# Patient Record
Sex: MALE | Race: WHITE | NOT HISPANIC OR LATINO | Employment: FULL TIME | URBAN - NONMETROPOLITAN AREA
[De-identification: names, ages, dates, MRNs, and addresses within clinical notes are randomized per-mention and may not be internally consistent; named-entity substitution may affect disease eponyms.]

---

## 2022-03-06 ENCOUNTER — HOSPITAL ENCOUNTER (EMERGENCY)
Facility: HOSPITAL | Age: 27
Discharge: HOME | End: 2022-03-06
Attending: EMERGENCY MEDICINE
Payer: COMMERCIAL

## 2022-03-06 ENCOUNTER — APPOINTMENT (EMERGENCY)
Dept: RADIOLOGY | Facility: HOSPITAL | Age: 27
End: 2022-03-06
Payer: COMMERCIAL

## 2022-03-06 VITALS
HEART RATE: 99 BPM | WEIGHT: 170 LBS | TEMPERATURE: 97.3 F | RESPIRATION RATE: 18 BRPM | DIASTOLIC BLOOD PRESSURE: 86 MMHG | HEIGHT: 66 IN | BODY MASS INDEX: 27.32 KG/M2 | OXYGEN SATURATION: 100 % | SYSTOLIC BLOOD PRESSURE: 143 MMHG

## 2022-03-06 DIAGNOSIS — S62.639B OPEN FRACTURE OF TUFT OF DISTAL PHALANX OF FINGER: ICD-10-CM

## 2022-03-06 DIAGNOSIS — S61.213A LACERATION OF LEFT MIDDLE FINGER: Primary | ICD-10-CM

## 2022-03-06 PROCEDURE — 90715 TDAP VACCINE 7 YRS/> IM: CPT | Performed by: PHYSICIAN ASSISTANT

## 2022-03-06 PROCEDURE — 99283 EMERGENCY DEPT VISIT LOW MDM: CPT | Performed by: PHYSICIAN ASSISTANT

## 2022-03-06 PROCEDURE — 90471 IMMUNIZATION ADMIN: CPT

## 2022-03-06 PROCEDURE — 12001 RPR S/N/AX/GEN/TRNK 2.5CM/<: CPT | Performed by: PHYSICIAN ASSISTANT

## 2022-03-06 PROCEDURE — 73140 X-RAY EXAM OF FINGER(S): CPT

## 2022-03-06 PROCEDURE — 99283 EMERGENCY DEPT VISIT LOW MDM: CPT

## 2022-03-06 RX ORDER — LIDOCAINE HYDROCHLORIDE 10 MG/ML
10 INJECTION, SOLUTION EPIDURAL; INFILTRATION; INTRACAUDAL; PERINEURAL ONCE
Status: COMPLETED | OUTPATIENT
Start: 2022-03-06 | End: 2022-03-06

## 2022-03-06 RX ORDER — CEPHALEXIN 500 MG/1
500 CAPSULE ORAL EVERY 6 HOURS SCHEDULED
Qty: 20 CAPSULE | Refills: 0 | Status: SHIPPED | OUTPATIENT
Start: 2022-03-06 | End: 2022-03-11

## 2022-03-06 RX ORDER — CEPHALEXIN 250 MG/1
500 CAPSULE ORAL ONCE
Status: COMPLETED | OUTPATIENT
Start: 2022-03-06 | End: 2022-03-06

## 2022-03-06 RX ORDER — BACITRACIN, NEOMYCIN, POLYMYXIN B 400; 3.5; 5 [USP'U]/G; MG/G; [USP'U]/G
1 OINTMENT TOPICAL ONCE
Status: COMPLETED | OUTPATIENT
Start: 2022-03-06 | End: 2022-03-06

## 2022-03-06 RX ADMIN — LIDOCAINE HYDROCHLORIDE 10 ML: 10 INJECTION, SOLUTION EPIDURAL; INFILTRATION; INTRACAUDAL; PERINEURAL at 15:55

## 2022-03-06 RX ADMIN — TETANUS TOXOID, REDUCED DIPHTHERIA TOXOID AND ACELLULAR PERTUSSIS VACCINE, ADSORBED 0.5 ML: 5; 2.5; 8; 8; 2.5 SUSPENSION INTRAMUSCULAR at 15:55

## 2022-03-06 RX ADMIN — BACITRACIN, NEOMYCIN, POLYMYXIN B 1 SMALL APPLICATION: 400; 3.5; 5 OINTMENT TOPICAL at 17:05

## 2022-03-06 RX ADMIN — CEPHALEXIN 500 MG: 250 CAPSULE ORAL at 17:05

## 2022-03-06 NOTE — DISCHARGE INSTRUCTIONS
Please keep wound clean and dry    Please return with any signs of infection such as red streaking away from the wound, pus drainage, fevers or chills  Please return or see her family doctor in 1 week for suture removal and wound recheck

## 2022-03-06 NOTE — ED ATTENDING ATTESTATION
3/6/2022  ISean MD, saw and evaluated the patient  I have discussed the patient with the resident/non-physician practitioner and agree with the resident's/non-physician practitioner's findings, Plan of Care, and MDM as documented in the resident's/non-physician practitioner's note, except where noted  All available labs and Radiology studies were reviewed  I was present for key portions of any procedure(s) performed by the resident/non-physician practitioner and I was immediately available to provide assistance  At this point I agree with the current assessment done in the Emergency Department  I have conducted an independent evaluation of this patient a history and physical is as follows:    ED Course     Laceration to distal phalanx of the left middle finger while working on his motorcycle today  Tetanus status unknown  On exam the patient is awake alert  Nontoxic appearing  There is a laceration through the nail at the midpoint  Bleeding is controlled  Cap refill less than 2 seconds      Critical Care Time  Procedures

## 2022-03-06 NOTE — ED PROVIDER NOTES
History  Chief Complaint   Patient presents with    Finger Laceration     pt arrives reporting he was working on his motorcylce when he got his finger caught in the machine and took off tip of left middle finger  75-year-old male presents emergency department for evaluation of left middle finger injury  Patient states he was working on his motorcycle when his finger got caught in the machine and sliced the tip of his finger  Unsure last tetanus  Denies numbness  Denies any pain medications prior to arrival   Does not want any pain medications now  No use of blood thinneres      History provided by:  Patient  Finger Laceration  Location:  Finger  Finger laceration location:  L middle finger  Length:  1  Depth:  Cutaneous  Quality: straight    Time since incident:  30 minutes  Laceration mechanism:  Metal edge  Foreign body present:  No foreign bodies  Relieved by:  Pressure  Tetanus status:  Unknown  Associated symptoms: no fever, no focal weakness, no numbness, no rash, no redness, no swelling and no streaking        None       History reviewed  No pertinent past medical history  Past Surgical History:   Procedure Laterality Date    WISDOM TOOTH EXTRACTION         History reviewed  No pertinent family history  I have reviewed and agree with the history as documented  E-Cigarette/Vaping     E-Cigarette/Vaping Substances     Social History     Tobacco Use    Smoking status: Never Smoker    Smokeless tobacco: Never Used   Substance Use Topics    Alcohol use: Yes    Drug use: Never       Review of Systems   Constitutional: Negative for fever  Skin: Positive for wound  Negative for rash  Neurological: Negative for focal weakness  All other systems reviewed and are negative  Physical Exam  Physical Exam  Vitals and nursing note reviewed  Constitutional:       Appearance: Normal appearance  HENT:      Head: Normocephalic     Eyes:      Conjunctiva/sclera: Conjunctivae normal    Pulmonary: Effort: Pulmonary effort is normal    Musculoskeletal:         General: No swelling  Normal range of motion  Skin:     General: Skin is warm and dry  Capillary Refill: Capillary refill takes less than 2 seconds  Findings: No bruising, erythema or rash  Comments: 1 cm laceration through the nail the left middle finger  Bleeding controlled  No gross contamination  No wound to the palmar aspect of the finger   Neurological:      General: No focal deficit present  Mental Status: He is alert and oriented to person, place, and time  Psychiatric:         Mood and Affect: Mood is anxious           Vital Signs  ED Triage Vitals [03/06/22 1545]   Temperature Pulse Respirations Blood Pressure SpO2   (!) 97 3 °F (36 3 °C) 99 18 143/86 100 %      Temp src Heart Rate Source Patient Position - Orthostatic VS BP Location FiO2 (%)   -- -- -- -- --      Pain Score       3           Vitals:    03/06/22 1545   BP: 143/86   Pulse: 99         Visual Acuity      ED Medications  Medications   cephalexin (KEFLEX) capsule 500 mg (has no administration in time range)   neomycin-bacitracin-polymyxin b (NEOSPORIN) ointment 1 small application (has no administration in time range)   tetanus-diphtheria-acellular pertussis (BOOSTRIX) IM injection 0 5 mL (0 5 mL Intramuscular Given 3/6/22 1555)   lidocaine (PF) (XYLOCAINE-MPF) 1 % injection 10 mL (10 mL Infiltration Given 3/6/22 1555)       Diagnostic Studies  Results Reviewed     None                 XR finger third digit-middle LEFT   ED Interpretation by Michel Rincon MD (03/06 7056)   Tuft fracture 3rd finger distal phalanx                 Procedures  Laceration repair    Date/Time: 3/6/2022 4:20 PM  Performed by: Gautam Escamilla PA-C  Authorized by: Gautam Escamilla PA-C   Consent given by: patient  Patient understanding: patient states understanding of the procedure being performed  Patient consent: the patient's understanding of the procedure matches consent given  Radiology Images displayed and confirmed  If images not available, report reviewed: imaging studies available  Patient identity confirmed: verbally with patient and arm band  Body area: upper extremity  Location details: left long finger  Laceration length: 1 cm  Tendon involvement: none  Nerve involvement: none  Anesthesia: digital block    Anesthesia:  Local Anesthetic: lidocaine 1% without epinephrine    Wound Dehiscence:  Superficial Wound Dehiscence: simple closure      Procedure Details:  Irrigation solution: saline  Irrigation method: syringe  Amount of cleaning: extensive  Debridement: none  Degree of undermining: none  Skin closure: 3-0 nylon  Number of sutures: 2  Technique: simple  Approximation: close  Approximation difficulty: simple  Dressing: antibiotic ointment  Patient tolerance: patient tolerated the procedure well with no immediate complications               ED Course  ED Course as of 03/06/22 1648   Sun Mar 06, 2022   1642 2 stitches placed in the finger  Wound well approximated  Patient started on prophylactic antibiotic  Strict return precautions were discussed he verbalized understanding  Appropriate follow-up was discussed any verbalized understanding  He remained well ED and was discharged                               SBIRT 22yo+      Most Recent Value   SBIRT (22 yo +)    In order to provide better care to our patients, we are screening all of our patients for alcohol and drug use  Would it be okay to ask you these screening questions? No Filed at: 03/06/2022 1548                    MDM  Number of Diagnoses or Management Options  Laceration of left middle finger: new and requires workup  Open fracture of tuft of distal phalanx of finger: new and requires workup  Diagnosis management comments: 42-year-old male presents to the emergency department for evaluation status post finger injury  Vitals and medical record reviewed  Tetanus was updated in the emergency department  Patient had a 1 cm laceration through the left middle finger nail  Wound was thoroughly irrigated  Closed with 2 stitches  X-ray concerning for tuft fracture  Patient was started on prophylactic antibiotic  Will follow-up with hand specialty  Strict return precautions were discussed knee verbalized understanding  He agreed to this treatment plan and was discharged  Amount and/or Complexity of Data Reviewed  Tests in the radiology section of CPT®: reviewed and ordered  Review and summarize past medical records: yes  Independent visualization of images, tracings, or specimens: yes        Disposition  Final diagnoses:   Laceration of left middle finger   Open fracture of tuft of distal phalanx of finger     Time reflects when diagnosis was documented in both MDM as applicable and the Disposition within this note     Time User Action Codes Description Comment    3/6/2022  4:38 PM Denise White Add [K46 202M] Laceration of left middle finger     3/6/2022  4:40 PM Denise White Add [S62 639B] Open fracture of tuft of distal phalanx of finger       ED Disposition     ED Disposition Condition Date/Time Comment    Discharge Stable Sun Mar 6, 2022  4:38 PM Clearnce Angelucci discharge to home/self care              Follow-up Information     Follow up With Specialties Details Why Erwin U  94  In 1 week For suture removal, For wound re-check [address] [place] [address]  [phone]            Patient's Medications   Discharge Prescriptions    CEPHALEXIN (KEFLEX) 500 MG CAPSULE    Take 1 capsule (500 mg total) by mouth every 6 (six) hours for 5 days       Start Date: 3/6/2022  End Date: 3/11/2022       Order Dose: 500 mg       Quantity: 20 capsule    Refills: 0           PDMP Review     None          ED Provider  Electronically Signed by           Eula Rust PA-C  03/06/22 1251

## 2024-04-24 ENCOUNTER — OFFICE VISIT (OUTPATIENT)
Dept: FAMILY MEDICINE CLINIC | Facility: CLINIC | Age: 29
End: 2024-04-24
Payer: COMMERCIAL

## 2024-04-24 VITALS
HEIGHT: 66 IN | DIASTOLIC BLOOD PRESSURE: 76 MMHG | HEART RATE: 74 BPM | OXYGEN SATURATION: 98 % | SYSTOLIC BLOOD PRESSURE: 120 MMHG | WEIGHT: 169 LBS | BODY MASS INDEX: 27.16 KG/M2

## 2024-04-24 DIAGNOSIS — R04.0 FREQUENT NOSEBLEEDS: ICD-10-CM

## 2024-04-24 DIAGNOSIS — L72.9 SCALP CYST: ICD-10-CM

## 2024-04-24 DIAGNOSIS — Z00.00 WELLNESS EXAMINATION: Primary | ICD-10-CM

## 2024-04-24 PROCEDURE — 99385 PREV VISIT NEW AGE 18-39: CPT | Performed by: FAMILY MEDICINE

## 2024-04-24 PROCEDURE — 3725F SCREEN DEPRESSION PERFORMED: CPT | Performed by: FAMILY MEDICINE

## 2024-04-24 NOTE — PROGRESS NOTES
ADULT ANNUAL PHYSICAL  [facility] PRIMARY CARE    NAME: Beau Jc  AGE: 28 y.o. SEX: male  : 1995     DATE: 2024     Assessment and Plan:     Problem List Items Addressed This Visit          Musculoskeletal and Integument    Scalp cyst     Will set up with surgeon for excision.  The location will get continually irritated otherwise         Relevant Orders    Ambulatory Referral to General Surgery       Other    Frequent nosebleeds     Or more frequent during the winter but in the last 3 to 4 months has only had 1.  Should try using saline nasal gel spray 1 spray each nostril twice a day during the winter          Other Visit Diagnoses       Wellness examination    -  Primary            Immunizations and preventive care screenings were discussed with patient today. Appropriate education was printed on patient's after visit summary.    Counseling:  Dental Health: discussed importance of regular tooth brushing, flossing, and dental visits.  Watch starch in diet      Depression Screening and Follow-up Plan: Patient was screened for depression during today's encounter. They screened negative with a PHQ-2 score of 0.        Return in about 1 year (around 2025) for Annual physical.     Chief Complaint:     Chief Complaint   Patient presents with    Establish Care    Physical Exam    Cyst     On top of head      History of Present Illness:     Adult Annual Physical   Patient here for a comprehensive physical exam. The patient reports problems - enlarging lump on the top of the scalp and problems with nosebleeds during the winter .    Diet and Physical Activity  Diet/Nutrition: poor diet.   Exercise: walking.      Depression Screening  PHQ-2/9 Depression Screening    Little interest or pleasure in doing things: 0 - not at all  Feeling down, depressed, or hopeless: 0 - not at all  PHQ-2 Score: 0  PHQ-2 Interpretation: Negative depression  screen       General Health  Sleep: sleeps well.   Hearing: normal - bilateral.  Vision: no vision problems.   Dental: no dental visits for >1 year.        Health  History of STDs?: no.    Advanced Care Planning  Do you have an advanced directive? no  Do you have a durable medical power of ? no  ACP document given to the patient? no     Review of Systems:     Review of Systems   Constitutional:  Negative for activity change, appetite change, chills, fatigue, fever and unexpected weight change.   HENT:  Positive for nosebleeds (Frequently during the winter but not in the last 3 months). Negative for congestion, dental problem, hearing loss, rhinorrhea and trouble swallowing.    Eyes:  Negative for visual disturbance.   Respiratory:  Negative for apnea, cough, chest tightness and shortness of breath.    Cardiovascular:  Negative for chest pain, palpitations and leg swelling.   Gastrointestinal:  Negative for abdominal distention, abdominal pain, constipation and diarrhea.   Endocrine: Negative for polyuria (Nocturia x 0-1).   Musculoskeletal:  Negative for arthralgias and myalgias.   Skin:  Negative for rash and wound (Does have enlarging lump on the top of the scalp over the last 4 years that is usually nontender).   Allergic/Immunologic: Negative for environmental allergies.   Neurological:  Negative for dizziness, weakness, light-headedness, numbness and headaches.   Hematological:  Negative for adenopathy.   Psychiatric/Behavioral:  Negative for agitation and sleep disturbance.       Past Medical History:     History reviewed. No pertinent past medical history.   Past Surgical History:     Past Surgical History:   Procedure Laterality Date    WISDOM TOOTH EXTRACTION        Social History:     Social History     Socioeconomic History    Marital status: Single     Spouse name: None    Number of children: None    Years of education: None    Highest education level: None   Occupational History    None  "  Tobacco Use    Smoking status: Former     Types: Cigarettes     Start date: 2016    Smokeless tobacco: Never   Vaping Use    Vaping status: Never Used   Substance and Sexual Activity    Alcohol use: Yes     Alcohol/week: 1.0 standard drink of alcohol     Types: 1 Cans of beer per week    Drug use: Never    Sexual activity: None   Other Topics Concern    None   Social History Narrative    None     Social Determinants of Health     Financial Resource Strain: Not on file   Food Insecurity: Not on file   Transportation Needs: Not on file   Physical Activity: Not on file   Stress: Not on file   Social Connections: Not on file   Intimate Partner Violence: Not on file   Housing Stability: Not on file      Family History:     Family History   Problem Relation Age of Onset    Diabetes Maternal Grandfather       Current Medications:     No current outpatient medications on file.     No current facility-administered medications for this visit.      Allergies:     No Known Allergies   Physical Exam:     Blood Pressure 120/76 (BP Location: Left arm, Patient Position: Sitting, Cuff Size: Standard)   Pulse 74   Height 5' 6\" (1.676 m)   Weight 76.7 kg (169 lb)   Oxygen Saturation 98%   Body Mass Index 27.28 kg/m²     Physical Exam  Vitals and nursing note reviewed.   Constitutional:       Appearance: Normal appearance.   HENT:      Head: Normocephalic.      Right Ear: Hearing, tympanic membrane, ear canal and external ear normal.      Left Ear: Hearing, tympanic membrane, ear canal and external ear normal.      Nose: Nose normal. No congestion.      Mouth/Throat:      Mouth: Mucous membranes are moist.      Dentition: Normal dentition.   Eyes:      Extraocular Movements: Extraocular movements intact.      Conjunctiva/sclera: Conjunctivae normal.      Pupils: Pupils are equal, round, and reactive to light.   Neck:      Vascular: No carotid bruit.   Cardiovascular:      Rate and Rhythm: Normal rate and regular rhythm.      " Pulses: Normal pulses.      Heart sounds: Normal heart sounds. No murmur (Rate is 72) heard.  Pulmonary:      Effort: Pulmonary effort is normal.      Breath sounds: Normal breath sounds.   Abdominal:      General: Abdomen is flat. There is no distension.      Palpations: Abdomen is soft. There is no mass.      Tenderness: There is no abdominal tenderness.      Hernia: There is no hernia in the left inguinal area or right inguinal area.   Genitourinary:     Penis: Normal and circumcised.       Testes: Normal.   Musculoskeletal:         General: No swelling.      Cervical back: Normal range of motion and neck supple. No tenderness. No muscular tenderness.      Right lower leg: No edema.      Left lower leg: No edema.   Lymphadenopathy:      Cervical: No cervical adenopathy.   Skin:     General: Skin is warm and dry.      Findings: No rash.   Neurological:      Mental Status: He is alert and oriented to person, place, and time.      Cranial Nerves: No cranial nerve deficit.      Motor: No weakness.      Coordination: Coordination normal.      Gait: Gait normal.      Deep Tendon Reflexes: Reflexes normal.   Psychiatric:         Mood and Affect: Mood normal.          Aba Nguyễn MD   [facility] PRIMARY [facility]

## 2024-04-24 NOTE — PATIENT INSTRUCTIONS
Overall exam today looks good.  Is going to be getting screening lab work done through work and should have this forwarded to the office.  If not going to do this should call the office and we will put an order for renal and lipid panel.  Will set up with surgery for excision of the cyst on the top of the scalp.  Continue to push to physical activity.  Overall diet sounds good.  Did recommend flu shot in the fall.  May consider using saline nasal gel spray 1 spray in each nostril during the winter which will moisturize the nasal passages and may help with the nosebleeds

## 2024-04-24 NOTE — PROGRESS NOTES
"Name: Beau Jc      : 1995      MRN: [id number]  Encounter Provider: Aba Nguyễn MD  Encounter Date: 2024   Encounter department: [facility] PRIMARY CARE    Assessment & Plan         Depression Screening and Follow-up Plan: Patient was screened for depression during today's encounter. They screened negative with a PHQ-2 score of 0.      No orders of the defined types were placed in this encounter.      Subjective        Review of Systems   Constitutional:  Negative for activity change, appetite change, chills, fatigue, fever and unexpected weight change.   HENT:  Negative for congestion, dental problem, hearing loss, rhinorrhea and trouble swallowing.    Eyes:  Negative for visual disturbance.   Respiratory:  Negative for apnea, cough, chest tightness and shortness of breath.    Cardiovascular:  Negative for chest pain, palpitations and leg swelling.   Gastrointestinal:  Negative for abdominal distention, abdominal pain, constipation and diarrhea.   Endocrine: Negative for polyuria (Nocturia x 0-1).   Musculoskeletal:  Negative for arthralgias and myalgias.   Skin:  Negative for rash and wound (However has enlarging lump on the top of the scalp over the last 4 years that is nontender).   Allergic/Immunologic: Negative for environmental allergies.   Neurological:  Negative for dizziness, weakness, light-headedness, numbness and headaches.   Hematological:  Negative for adenopathy.   Psychiatric/Behavioral:  Negative for agitation and sleep disturbance.          Objective     Blood Pressure 120/76 (BP Location: Left arm, Patient Position: Sitting, Cuff Size: Standard)   Pulse 74   Height 5' 6\" (1.676 m)   Weight 76.7 kg (169 lb)   Oxygen Saturation 98%   Body Mass Index 27.28 kg/m²       Physical Exam  Vitals and nursing note reviewed.   Constitutional:       Appearance: Normal appearance. He is well-developed.   HENT:      Head: Normocephalic.      Right Ear: " Hearing, tympanic membrane, ear canal and external ear normal.      Left Ear: Hearing, tympanic membrane, ear canal and external ear normal.      Nose: Nose normal.      Mouth/Throat:      Mouth: Mucous membranes are moist.      Dentition: Normal dentition.   Eyes:      Pupils: Pupils are equal, round, and reactive to light.   Neck:      Thyroid: No thyromegaly.      Vascular: No carotid bruit.   Cardiovascular:      Rate and Rhythm: Normal rate and regular rhythm.      Heart sounds: Normal heart sounds. No murmur (Rate is 66) heard.  Pulmonary:      Effort: Pulmonary effort is normal.      Breath sounds: Normal breath sounds.   Abdominal:      General: There is no distension.      Palpations: There is no mass.      Tenderness: There is no abdominal tenderness.      Hernia: There is no hernia in the left inguinal area or right inguinal area.   Genitourinary:     Penis: Normal and circumcised.       Testes: Normal.   Musculoskeletal:         General: Normal range of motion.      Cervical back: Normal range of motion and neck supple. No tenderness.      Right lower leg: No edema.      Left lower leg: No edema.   Lymphadenopathy:      Cervical: No cervical adenopathy.   Skin:     General: Skin is warm and dry.      Findings: No rash.   Neurological:      Mental Status: He is alert and oriented to person, place, and time.      Gait: Gait normal.      Deep Tendon Reflexes: Reflexes normal.   Psychiatric:         Mood and Affect: Mood normal.         Behavior: Behavior normal.         Thought Content: Thought content normal.         Judgment: Judgment normal.

## 2024-04-24 NOTE — ASSESSMENT & PLAN NOTE
Or more frequent during the winter but in the last 3 to 4 months has only had 1.  Should try using saline nasal gel spray 1 spray each nostril twice a day during the winter